# Patient Record
Sex: MALE | Race: WHITE | URBAN - METROPOLITAN AREA
[De-identification: names, ages, dates, MRNs, and addresses within clinical notes are randomized per-mention and may not be internally consistent; named-entity substitution may affect disease eponyms.]

---

## 2017-12-19 ENCOUNTER — OFFICE VISIT (OUTPATIENT)
Dept: FAMILY MEDICINE CLINIC | Age: 32
End: 2017-12-19

## 2017-12-19 VITALS
DIASTOLIC BLOOD PRESSURE: 79 MMHG | RESPIRATION RATE: 16 BRPM | HEIGHT: 73 IN | SYSTOLIC BLOOD PRESSURE: 122 MMHG | HEART RATE: 89 BPM | TEMPERATURE: 97.8 F | OXYGEN SATURATION: 98 % | WEIGHT: 172.6 LBS | BODY MASS INDEX: 22.88 KG/M2

## 2017-12-19 DIAGNOSIS — G25.0 ESSENTIAL TREMOR: ICD-10-CM

## 2017-12-19 DIAGNOSIS — Z00.00 ANNUAL PHYSICAL EXAM: Primary | ICD-10-CM

## 2017-12-19 DIAGNOSIS — Z23 ENCOUNTER FOR IMMUNIZATION: ICD-10-CM

## 2017-12-19 NOTE — PROGRESS NOTES
Subjective:   Edgardo Montalvo is an 27 y.o. male w/o significant medical history who presents for complete physical exam.    The patient is new to me and to [facility] practice. Doing well. Concerns today:   He is having intermittent bilateral arm tremor at rest sometimes exacerbated by hunger, alleviated by food. He was having it since his childhood. Not really bothers him. Drinks 1 cup of coffee in the morning. No gait change. Diet: He thinks \"its healthy\" , eats veggies and salads every dinner, chicken, fish. No fast food, no soda. Drinks plenty of pure water during the day. Exercise:No only, when playing with the kids outside. Work: Works at [employer]. Social: Lives with his family, wife and 2 kids 3 and 2 yo boys. No family history of colon cancer. Allergies - reviewed:   No Known Allergies      Medications - reviewed:  No current outpatient prescriptions on file. No current facility-administered medications for this visit. Past Medical History - reviewed:  History reviewed. No pertinent past medical history. Past Surgical History - reviewed:  History reviewed. No pertinent surgical history. Family History - reviewed:  Family History   Problem Relation Age of Onset    No Known Problems Mother     Cancer Father      Pancreatic Cancer     Stroke Father      2016    Cancer Maternal Grandmother      Rectal Cancer    Tremors Maternal Grandmother      Facial Tumor         Social History - reviewed:  Social History     Social History    Marital status: UNKNOWN     Spouse name: N/A    Number of children: N/A    Years of education: N/A     Occupational History    Not on file.      Social History Main Topics    Smoking status: Current Some Day Smoker    Smokeless tobacco: Never Used    Alcohol use 4.2 oz/week     7 Cans of beer per week      Comment: Socially    Drug use: No      Comment: 3 to 4 Cigs per weeks    Sexual activity: Yes     Partners: Female     Other Topics Concern    Not on file     Social History Narrative    No narrative on file         Immunizations - reviewed:   Immunization History   Administered Date(s) Administered    Influenza Vaccine 10/30/2017    Tdap 12/19/2017         Health Maintenance reviewed -  Colonoscopy Not indicated  Hepatitis C testing Declined  Lung cancer screening Declined      Review of Systems   CONSTITUTIONAL: denies fever. Denies chills. EYES: denies double vision. Has blurry vision relieved with contacts. ENT: denies sinus congestion. Denies sinus drainage  CARDIOVASCULAR: denies chest pain. Denies palpitations  RESPIRATORY: denies shortness of breath  GI: denies abdominal pain. Denies change in stools. Denies hematochezia/melana  : denies dysuria, urinary straining and burning with urination. NEURO: +Tremors bilaterally  MUSCULOSKELETAL: denies joint pain. SKIN: denies rash. Denies easy bruising  PSYCH: denies anxiety.  Denies depression          Objective:     Visit Vitals    /79 (BP 1 Location: Left arm, BP Patient Position: Sitting)    Pulse 89    Temp 97.8 °F (36.6 °C) (Oral)    Resp 16    Ht 6' 0.5\" (1.842 m)    Wt 172 lb 9.6 oz (78.3 kg)    SpO2 98%    BMI 23.09 kg/m2       General appearance - alert, well appearing, and in no distress  Eyes - pupils equal and reactive, extraocular eye movements intact  Ears - bilateral TM's and external ear canals normal  Nose - normal and patent, no erythema, discharge or polyps  Mouth - mucous membranes moist, pharynx normal without lesions  Neck - supple, no significant adenopathy  Chest - clear to auscultation, no wheezes, rales or rhonchi, symmetric air entry  Heart - normal rate, regular rhythm, normal S1, S2, no murmurs, rubs, clicks or gallops  Abdomen - soft, nontender, nondistended, no masses or organomegaly  Neurological - alert, oriented, normal speech, no focal findings or movement disorder noted  Musculoskeletal - no joint tenderness, deformity or swelling  Extremities - peripheral pulses normal, no pedal edema, no clubbing or cyanosis  Skin - normal coloration and turgor, no rashes, no suspicious skin lesions noted      Assessment:   28 yo male with essential tremor who is here for CPE. ICD-10-CM ICD-9-CM    1. Annual physical exam Z00.00 V70.0 CBC W/O DIFF      METABOLIC PANEL, COMPREHENSIVE      LIPID PANEL   2. Essential tremor G25.0 333.1    3. Encounter for immunization Z23 V03.89 TETANUS, DIPHTHERIA TOXOIDS AND ACELLULAR PERTUSSIS VACCINE (TDAP), IN INDIVIDS. >=7, IM      MS IMMUNIZ ADMIN,1 SINGLE/COMB VAC/TOXOID         Plan:   1. Preventive visit:  · Counseled re: diet, exercise, healthy lifestyle    · Appropriate labs, vaccines, imaging studies, and referrals ordered as listed above    · Discussed the patient's BMI with him. Continue current diet, consider starting exercise daily. · The patient was counseled on the dangers of tobacco use, and was advised to quit. Reviewed strategies to maximize success, including written materials. 2. Essenatial tremor. Not bothering th pt. Will monitor for now. Advice snacks if helps with the tremors, drink plenty of fluids. If worsening of the symptoms may consider starting Propranolol. I have discussed the diagnosis with the patient and the intended plan as seen in the above orders. The patient has received an after-visit summary and questions were answered concerning future plans. I have discussed medication side effects and warnings with the patient as well. Informed pt to return to the office if new symptoms arise.       Heather Israel MD  Family Medicine Resident, PGY-2

## 2017-12-19 NOTE — PATIENT INSTRUCTIONS
Well Visit, Ages 25 to 48: Care Instructions  Your Care Instructions    Physical exams can help you stay healthy. Your doctor has checked your overall health and may have suggested ways to take good care of yourself. He or she also may have recommended tests. At home, you can help prevent illness with healthy eating, regular exercise, and other steps. Follow-up care is a key part of your treatment and safety. Be sure to make and go to all appointments, and call your doctor if you are having problems. It's also a good idea to know your test results and keep a list of the medicines you take. How can you care for yourself at home? · Reach and stay at a healthy weight. This will lower your risk for many problems, such as obesity, diabetes, heart disease, and high blood pressure. · Get at least 30 minutes of physical activity on most days of the week. Walking is a good choice. You also may want to do other activities, such as running, swimming, cycling, or playing tennis or team sports. Discuss any changes in your exercise program with your doctor. · Do not smoke or allow others to smoke around you. If you need help quitting, talk to your doctor about stop-smoking programs and medicines. These can increase your chances of quitting for good. · Talk to your doctor about whether you have any risk factors for sexually transmitted infections (STIs). Having one sex partner (who does not have STIs and does not have sex with anyone else) is a good way to avoid these infections. · Use birth control if you do not want to have children at this time. Talk with your doctor about the choices available and what might be best for you. · Protect your skin from too much sun. When you're outdoors from 10 a.m. to 4 p.m., stay in the shade or cover up with clothing and a hat with a wide brim. Wear sunglasses that block UV rays. Even when it's cloudy, put broad-spectrum sunscreen (SPF 30 or higher) on any exposed skin.   · See a dentist one or two times a year for checkups and to have your teeth cleaned. · Wear a seat belt in the car. · Drink alcohol in moderation, if at all. That means no more than 2 drinks a day for men and 1 drink a day for women. Follow your doctor's advice about when to have certain tests. These tests can spot problems early. For everyone  · Cholesterol. Have the fat (cholesterol) in your blood tested after age 21. Your doctor will tell you how often to have this done based on your age, family history, or other things that can increase your risk for heart disease. · Blood pressure. Have your blood pressure checked during a routine doctor visit. Your doctor will tell you how often to check your blood pressure based on your age, your blood pressure results, and other factors. · Vision. Talk with your doctor about how often to have a glaucoma test.  · Diabetes. Ask your doctor whether you should have tests for diabetes. · Colon cancer. Have a test for colon cancer at age 48. You may have one of several tests. If you are younger than 48, you may need a test earlier if you have any risk factors. Risk factors include whether you already had a precancerous polyp removed from your colon or whether your parent, brother, sister, or child has had colon cancer. For women  · Breast exam and mammogram. Talk to your doctor about when you should have a clinical breast exam and a mammogram. Medical experts differ on whether and how often women under 50 should have these tests. Your doctor can help you decide what is right for you. · Pap test and pelvic exam. Begin Pap tests at age 24. A Pap test is the best way to find cervical cancer. The test often is part of a pelvic exam. Ask how often to have this test.  · Tests for sexually transmitted infections (STIs). Ask whether you should have tests for STIs. You may be at risk if you have sex with more than one person, especially if your partners do not wear condoms.   For men  · Tests for sexually transmitted infections (STIs). Ask whether you should have tests for STIs. You may be at risk if you have sex with more than one person, especially if you do not wear a condom. · Testicular cancer exam. Ask your doctor whether you should check your testicles regularly. · Prostate exam. Talk to your doctor about whether you should have a blood test (called a PSA test) for prostate cancer. Experts differ on whether and when men should have this test. Some experts suggest it if you are older than 39 and are -American or have a father or brother who got prostate cancer when he was younger than 72. When should you call for help? Watch closely for changes in your health, and be sure to contact your doctor if you have any problems or symptoms that concern you. Where can you learn more? Go to http://janel-chrystal.info/. Enter P072 in the search box to learn more about \"Well Visit, Ages 25 to 48: Care Instructions. \"  Current as of: May 12, 2017  Content Version: 11.4  © 8150-9829 [employer]. Care instructions adapted under license by [employer] (which disclaims liability or warranty for this information). If you have questions about a medical condition or this instruction, always ask your healthcare professional. [place] [address] any warranty or liability for your use of this information. Benign Essential Tremor: Care Instructions  Your Care Instructions    Benign essential tremor is a medical term for shaking that you can't control. Your hand or fingers may shake when you lift a cup or point at something. Or your voice may shake when you speak. This type of tremor is not harmful. It is not caused by a stroke or Parkinson's disease. Some things can affect how much you shake. For example, drinking or eating something with caffeine may make tremors worse for a while. Some medicines also can increase tremors.  These include antidepressants and too much thyroid replacement. Talk to your doctor if you think one of your medicines makes your tremors worse. If you are self-conscious about your tremors, there are some things you can do to reduce them or make them less noticeable. This includes taking medicine. Follow-up care is a key part of your treatment and safety. Be sure to make and go to all appointments, and call your doctor if you are having problems. It's also a good idea to know your test results and keep a list of the medicines you take. How can you care for yourself at home? · Take your medicines exactly as prescribed. Call your doctor if you think you are having a problem with your medicine. Some medicines that help control tremors have to be taken every day, even if you are not having tremors. You will get more details on the specific medicines your doctor prescribes. · Get plenty of rest.  · Eat a balanced, healthy diet. · Try to reduce stress. Regular exercise and massages may help. · Limit alcohol. Heavy drinking can make your tremors worse. · Avoid drinks or foods with caffeine if they make your tremors worse. These include tea, cola, coffee, and chocolate. · Wear a heavy bracelet or watch. This adds a little weight to your hand. The extra weight may reduce tremors. · Drink from cups or glasses that are only half full. You may also want to try drinking with a straw. When should you call for help? Watch closely for changes in your health, and be sure to contact your doctor if:  ? · You notice your tremors are getting worse. ? · You can't do your everyday activities because of your tremors. ? · You are sad and embarrassed about your shaking. Where can you learn more? Go to http://janel-chrystal.info/. Enter B746 in the search box to learn more about \"Benign Essential Tremor: Care Instructions. \"  Current as of: October 14, 2016  Content Version: 11.4  © 7822-8800 [employer]. Care instructions adapted under license by [employer] (which disclaims liability or warranty for this information). If you have questions about a medical condition or this instruction, always ask your healthcare professional. [address] any warranty or liability for your use of this information.

## 2017-12-19 NOTE — PROGRESS NOTES
Chief Complaint   Patient presents with   [address]     Patient is new to the area the beginning of 2017 and establish care     Well Male     1. Have you been to the ER, urgent care clinic since your last visit? Hospitalized since your last visit? No    2. Have you seen or consulted any other health care providers outside of the [address] since your last visit? Include any pap smears or colon screening. Calli Vázquez is a 27 y.o. male who presents for routine immunizations. He denies any symptoms , reactions or allergies that would exclude them from being immunized today. Risks and adverse reactions were discussed and the VIS was given to them. All questions were addressed. He was observed for 15 min post injection. There were no reactions observed. Jessie Zamorano LPN      Verbal order given for to order Adult Tdap per physician. Verbal order repeated for clarification and verification.  Tdap ordered per physician

## 2017-12-19 NOTE — MR AVS SNAPSHOT
Visit Information Date & Time Provider Department Dept. Phone Encounter #  
 12/19/2017  9:15 AM Eva Soler MD [facility] [phone] [id number] Upcoming Health Maintenance Date Due DTaP/Tdap/Td series (2 - Td) 12/19/2027 Allergies as of 12/19/2017  Review Complete On: 12/19/2017 By: Eva Soler MD  
 No Known Allergies Current Immunizations  Never Reviewed Name Date Influenza Vaccine 10/30/2017 Not reviewed this visit You Were Diagnosed With   
  
 Codes Comments Annual physical exam    -  Primary ICD-10-CM: Z00.00 ICD-9-CM: V70.0 Essential tremor     ICD-10-CM: G25.0 ICD-9-CM: 333.1 Vitals BP Pulse Temp Resp Weight(growth percentile) SpO2  
 122/79 (BP 1 Location: Left arm, BP Patient Position: Sitting) 89 97.8 °F (36.6 °C) (Oral) 16 172 lb 9.6 oz (78.3 kg) 98% Smoking Status Current Some Day Smoker Vitals History Preferred Pharmacy Pharmacy Name Phone [id number] [address], [address] [phone] Your Updated Medication List  
  
Notice  As of 12/19/2017 10:04 AM  
 You have not been prescribed any medications. We Performed the Following CBC W/O DIFF [96965 CPT(R)] LIPID PANEL [42029 CPT(R)] METABOLIC PANEL, COMPREHENSIVE [20292 CPT(R)] Patient Instructions Well Visit, Ages 25 to 48: Care Instructions Your Care Instructions Physical exams can help you stay healthy. Your doctor has checked your overall health and may have suggested ways to take good care of yourself. He or she also may have recommended tests. At home, you can help prevent illness with healthy eating, regular exercise, and other steps. Follow-up care is a key part of your treatment and safety.  Be sure to make and go to all appointments, and call your doctor if you are having problems. It's also a good idea to know your test results and keep a list of the medicines you take. How can you care for yourself at home? · Reach and stay at a healthy weight. This will lower your risk for many problems, such as obesity, diabetes, heart disease, and high blood pressure. · Get at least 30 minutes of physical activity on most days of the week. Walking is a good choice. You also may want to do other activities, such as running, swimming, cycling, or playing tennis or team sports. Discuss any changes in your exercise program with your doctor. · Do not smoke or allow others to smoke around you. If you need help quitting, talk to your doctor about stop-smoking programs and medicines. These can increase your chances of quitting for good. · Talk to your doctor about whether you have any risk factors for sexually transmitted infections (STIs). Having one sex partner (who does not have STIs and does not have sex with anyone else) is a good way to avoid these infections. · Use birth control if you do not want to have children at this time. Talk with your doctor about the choices available and what might be best for you. · Protect your skin from too much sun. When you're outdoors from 10 a.m. to 4 p.m., stay in the shade or cover up with clothing and a hat with a wide brim. Wear sunglasses that block UV rays. Even when it's cloudy, put broad-spectrum sunscreen (SPF 30 or higher) on any exposed skin. · See a dentist one or two times a year for checkups and to have your teeth cleaned. · Wear a seat belt in the car. · Drink alcohol in moderation, if at all. That means no more than 2 drinks a day for men and 1 drink a day for women. Follow your doctor's advice about when to have certain tests. These tests can spot problems early. For everyone · Cholesterol. Have the fat (cholesterol) in your blood tested after age 21.  Your doctor will tell you how often to have this done based on your age, family history, or other things that can increase your risk for heart disease. · Blood pressure. Have your blood pressure checked during a routine doctor visit. Your doctor will tell you how often to check your blood pressure based on your age, your blood pressure results, and other factors. · Vision. Talk with your doctor about how often to have a glaucoma test. 
· Diabetes. Ask your doctor whether you should have tests for diabetes. · Colon cancer. Have a test for colon cancer at age 48. You may have one of several tests. If you are younger than 48, you may need a test earlier if you have any risk factors. Risk factors include whether you already had a precancerous polyp removed from your colon or whether your parent, brother, sister, or child has had colon cancer. For women · Breast exam and mammogram. Talk to your doctor about when you should have a clinical breast exam and a mammogram. Medical experts differ on whether and how often women under 50 should have these tests. Your doctor can help you decide what is right for you. · Pap test and pelvic exam. Begin Pap tests at age 24. A Pap test is the best way to find cervical cancer. The test often is part of a pelvic exam. Ask how often to have this test. 
· Tests for sexually transmitted infections (STIs). Ask whether you should have tests for STIs. You may be at risk if you have sex with more than one person, especially if your partners do not wear condoms. For men · Tests for sexually transmitted infections (STIs). Ask whether you should have tests for STIs. You may be at risk if you have sex with more than one person, especially if you do not wear a condom. · Testicular cancer exam. Ask your doctor whether you should check your testicles regularly. · Prostate exam. Talk to your doctor about whether you should have a blood test (called a PSA test) for prostate cancer.  Experts differ on whether and when men should have this test. Some experts suggest it if you are older than 39 and are -American or have a father or brother who got prostate cancer when he was younger than 72. When should you call for help? Watch closely for changes in your health, and be sure to contact your doctor if you have any problems or symptoms that concern you. Where can you learn more? Go to http://janel-chrystal.info/. Enter P072 in the search box to learn more about \"Well Visit, Ages 25 to 48: Care Instructions. \" Current as of: May 12, 2017 Content Version: 11.4 © 7120-3574 ServiceTrade. Care instructions adapted under license by Ribbit (which disclaims liability or warranty for this information). If you have questions about a medical condition or this instruction, always ask your healthcare professional. [address] any warranty or liability for your use of this information. Benign Essential Tremor: Care Instructions Your Care Instructions Benign essential tremor is a medical term for shaking that you can't control. Your hand or fingers may shake when you lift a cup or point at something. Or your voice may shake when you speak. This type of tremor is not harmful. It is not caused by a stroke or Parkinson's disease. Some things can affect how much you shake. For example, drinking or eating something with caffeine may make tremors worse for a while. Some medicines also can increase tremors. These include antidepressants and too much thyroid replacement. Talk to your doctor if you think one of your medicines makes your tremors worse. If you are self-conscious about your tremors, there are some things you can do to reduce them or make them less noticeable. This includes taking medicine. Follow-up care is a key part of your treatment and safety.  Be sure to make and go to all appointments, and call your doctor if you are having problems. It's also a good idea to know your test results and keep a list of the medicines you take. How can you care for yourself at home? · Take your medicines exactly as prescribed. Call your doctor if you think you are having a problem with your medicine. Some medicines that help control tremors have to be taken every day, even if you are not having tremors. You will get more details on the specific medicines your doctor prescribes. · Get plenty of rest. 
· Eat a balanced, healthy diet. · Try to reduce stress. Regular exercise and massages may help. · Limit alcohol. Heavy drinking can make your tremors worse. · Avoid drinks or foods with caffeine if they make your tremors worse. These include tea, cola, coffee, and chocolate. · Wear a heavy bracelet or watch. This adds a little weight to your hand. The extra weight may reduce tremors. · Drink from cups or glasses that are only half full. You may also want to try drinking with a straw. When should you call for help? Watch closely for changes in your health, and be sure to contact your doctor if: 
? · You notice your tremors are getting worse. ? · You can't do your everyday activities because of your tremors. ? · You are sad and embarrassed about your shaking. Where can you learn more? Go to http://janel-chrystal.info/. Enter B746 in the search box to learn more about \"Benign Essential Tremor: Care Instructions. \" Current as of: October 14, 2016 Content Version: 11.4 © 4113-3752 Healthwise, Incorporated. Care instructions adapted under license by Shelfbucks (which disclaims liability or warranty for this information). If you have questions about a medical condition or this instruction, always ask your healthcare professional. [place] [address] any warranty or liability for your use of this information. Introducing \[facility]\"" & HEALTH SERVICES! Romayne Duster introduces OncoGenex patient portal. Now you can access parts of your medical record, email your doctor's office, and request medication refills online. 1. In your internet browser, go to [email or web address] 2. Click on the First Time User? Click Here link in the Sign In box. You will see the New Member Sign Up page. 3. Enter your OncoGenex Access Code exactly as it appears below. You will not need to use this code after youve completed the sign-up process. If you do not sign up before the expiration date, you must request a new code. · OncoGenex Access Code: [email or web address] Expires: 3/19/2018  9:33 AM 
 
4. Enter the last four digits of your Social Security Number (xxxx) and Date of Birth (mm/dd/yyyy) as indicated and click Submit. You will be taken to the next sign-up page. 5. Create a OncoGenex ID. This will be your OncoGenex login ID and cannot be changed, so think of one that is secure and easy to remember. 6. Create a OncoGenex password. You can change your password at any time. 7. Enter your Password Reset Question and Answer. This can be used at a later time if you forget your password. 8. Enter your e-mail address. You will receive e-mail notification when new information is available in [address] 9. Click Sign Up. You can now view and download portions of your medical record. 10. Click the Download Summary menu link to download a portable copy of your medical information. If you have questions, please visit the Frequently Asked Questions section of the OncoGenex website. Remember, OncoGenex is NOT to be used for urgent needs. For medical emergencies, dial 911. Now available from your iPhone and Android! Please provide this summary of care documentation to your next provider. Your primary care clinician is listed as Rodell Rinne. If you have any questions after today's visit, please call [phone].

## 2017-12-20 LAB
ALBUMIN SERPL-MCNC: 4.6 G/DL (ref 3.5–5.5)
ALBUMIN/GLOB SERPL: 2 {RATIO} (ref 1.2–2.2)
ALP SERPL-CCNC: 77 IU/L (ref 39–117)
ALT SERPL-CCNC: 15 IU/L (ref 0–44)
AST SERPL-CCNC: 19 IU/L (ref 0–40)
BILIRUB SERPL-MCNC: 0.6 MG/DL (ref 0–1.2)
BUN SERPL-MCNC: 21 MG/DL (ref 6–20)
BUN/CREAT SERPL: 18 (ref 9–20)
CALCIUM SERPL-MCNC: 9.8 MG/DL (ref 8.7–10.2)
CHLORIDE SERPL-SCNC: 100 MMOL/L (ref 96–106)
CHOLEST SERPL-MCNC: 187 MG/DL (ref 100–199)
CO2 SERPL-SCNC: 21 MMOL/L (ref 18–29)
CREAT SERPL-MCNC: 1.19 MG/DL (ref 0.76–1.27)
ERYTHROCYTE [DISTWIDTH] IN BLOOD BY AUTOMATED COUNT: 13.2 % (ref 12.3–15.4)
GLOBULIN SER CALC-MCNC: 2.3 G/DL (ref 1.5–4.5)
GLUCOSE SERPL-MCNC: 82 MG/DL (ref 65–99)
HCT VFR BLD AUTO: 47.3 % (ref 37.5–51)
HDLC SERPL-MCNC: 72 MG/DL
HGB BLD-MCNC: 16.4 G/DL (ref 13–17.7)
INTERPRETATION, 910389: NORMAL
LDLC SERPL CALC-MCNC: 103 MG/DL (ref 0–99)
MCH RBC QN AUTO: 31.1 PG (ref 26.6–33)
MCHC RBC AUTO-ENTMCNC: 34.7 G/DL (ref 31.5–35.7)
MCV RBC AUTO: 90 FL (ref 79–97)
PLATELET # BLD AUTO: 185 X10E3/UL (ref 150–379)
POTASSIUM SERPL-SCNC: 5 MMOL/L (ref 3.5–5.2)
PROT SERPL-MCNC: 6.9 G/DL (ref 6–8.5)
RBC # BLD AUTO: 5.28 X10E6/UL (ref 4.14–5.8)
SODIUM SERPL-SCNC: 141 MMOL/L (ref 134–144)
TRIGL SERPL-MCNC: 61 MG/DL (ref 0–149)
VLDLC SERPL CALC-MCNC: 12 MG/DL (ref 5–40)
WBC # BLD AUTO: 5.2 X10E3/UL (ref 3.4–10.8)

## 2017-12-29 ENCOUNTER — TELEPHONE (OUTPATIENT)
Dept: FAMILY MEDICINE CLINIC | Age: 32
End: 2017-12-29

## 2017-12-29 NOTE — TELEPHONE ENCOUNTER
I called the patient at [phone]  to discuss the test results.   Left a message to call back the clinic.    6:57 PM  12/29/2017  Artemio Balderrama MD

## 2018-01-25 ENCOUNTER — OFFICE VISIT (OUTPATIENT)
Dept: FAMILY MEDICINE CLINIC | Age: 33
End: 2018-01-25

## 2018-01-25 VITALS
RESPIRATION RATE: 16 BRPM | OXYGEN SATURATION: 94 % | TEMPERATURE: 97.7 F | SYSTOLIC BLOOD PRESSURE: 138 MMHG | BODY MASS INDEX: 23.32 KG/M2 | HEART RATE: 80 BPM | WEIGHT: 172.2 LBS | DIASTOLIC BLOOD PRESSURE: 88 MMHG | HEIGHT: 72 IN

## 2018-01-25 DIAGNOSIS — R05.9 COUGH: Primary | ICD-10-CM

## 2018-01-25 DIAGNOSIS — J01.00 ACUTE MAXILLARY SINUSITIS, RECURRENCE NOT SPECIFIED: ICD-10-CM

## 2018-01-25 RX ORDER — DOXYCYCLINE 100 MG/1
200 TABLET ORAL DAILY
Qty: 10 TAB | Refills: 0 | Status: SHIPPED | OUTPATIENT
Start: 2018-01-25 | End: 2018-01-30

## 2018-01-25 NOTE — PROGRESS NOTES
HPI       Kandace Starkey is a 27 y.o. male with no significant pmhx who presents for acute visit. Cough x 2 weeks - productive but doesn't wake him up at night. Head pain, HA, jaw and ear pain x a few days - sharp pains in ears, HA  Facial pressure, ears popping with blowing of nose  Very mild sore throat from the cough, has not affected swallowing. Has decreased appetite. Had chills one night about 1.5 weeks ago  No fevers, nausea or vomiting, body aches, diarrhea, CP or SOB  Has been taking Advil cold and sinus continuously, thinks it helps w headaches. Hasn't tried anything for the cough. Received flu vaccine 10/2017    Sick contacts - coworkers have been sick with cold symptoms, 1year old has now started to have similar symptoms. No hx of asthma or COPD, no hospitalizations for respiratory status. Occasional social smoker but has not had a cigarette in over a month. PMHx:  History reviewed. No pertinent past medical history. Meds:     Allergies:   No Known Allergies    Smoker:  History   Smoking Status    Current Some Day Smoker   Smokeless Tobacco    Never Used     ETOH:   History   Alcohol Use    4.2 oz/week    7 Cans of beer per week     Comment: Socially     FH:   Family History   Problem Relation Age of Onset    No Known Problems Mother     Cancer Father      Pancreatic Cancer     Stroke Father      2016    Cancer Maternal Grandmother      Rectal Cancer    Tremors Maternal Grandmother      Facial Tumor       ROS  Review of Systems   Constitutional: Negative for chills, fever and malaise/fatigue. HENT: Positive for congestion, ear pain, sinus pain and sore throat. Negative for ear discharge. Respiratory: Positive for cough and sputum production. Negative for shortness of breath and wheezing. Cardiovascular: Negative for chest pain and palpitations. Gastrointestinal: Negative for abdominal pain, constipation, diarrhea, nausea and vomiting.    Musculoskeletal: Negative for back pain, joint pain and myalgias. Neurological: Positive for headaches. Negative for dizziness. Physical Exam:  Visit Vitals    /88 (BP 1 Location: Right arm, BP Patient Position: Sitting)    Pulse 80    Temp 97.7 °F (36.5 °C) (Oral)    Resp 16    Ht 6' (1.829 m)    Wt 172 lb 3.2 oz (78.1 kg)    SpO2 94%    BMI 23.35 kg/m2       Wt Readings from Last 3 Encounters:   01/25/18 172 lb 3.2 oz (78.1 kg)   12/19/17 172 lb 9.6 oz (78.3 kg)     BP Readings from Last 3 Encounters:   01/25/18 138/88   12/19/17 122/79      Physical Exam   Constitutional: He is oriented to person, place, and time. He appears well-developed and well-nourished. HENT:   Head: Normocephalic and atraumatic. Right Ear: External ear normal.   Left Ear: External ear normal.   Nose: Nose normal.   Mouth/Throat: Oropharynx is clear and moist. No oropharyngeal exudate. Eyes: EOM are normal. Pupils are equal, round, and reactive to light. Cardiovascular: Normal rate, regular rhythm, normal heart sounds and intact distal pulses. Exam reveals no gallop and no friction rub. No murmur heard. Pulmonary/Chest: Effort normal. No respiratory distress. Intermittent end-inspiratory wheezing present at RLB and R lung anteriorly. Otherwise clear. Abdominal: Soft. Bowel sounds are normal.   Neurological: He is alert and oriented to person, place, and time. Skin: Skin is warm and dry. I personally reviewed the following lab results:   No results found for this or any previous visit (from the past 12 hour(s)). Assessment     27 y.o. male with:    ICD-10-CM ICD-9-CM    1. Cough R05 786.2 XR CHEST PA LAT   2.  Acute maxillary sinusitis, recurrence not specified J01.00 461.0 doxycycline (ADOXA) 100 mg tablet              Plan       Orders Placed This Encounter    XR CHEST PA LAT    doxycycline (ADOXA) 100 mg tablet     Bacterial sinusitis  - CXR clear for any pneumonia or acute abnormality, personally interpreted by myself and Dr. French Huizar  - Doxycyline 200mg daily x 5 days  - Patient to RTC if symptoms not improving over the next several days  - ED precautions discussed    Patient discussed and seen with Dr. French Huizar    I have discussed the diagnosis with the patient and the intended plan as seen in the above orders. The patient has received an after-visit summary and questions were answered concerning future plans. I have discussed medication side effects and warnings with the patient as well.     Digna Winchester MD  Family Medicine Resident  PGY-1

## 2018-01-25 NOTE — PROGRESS NOTES
I reviewed with the resident the medical history and the resident's findings on the physical examination. I discussed with the resident the patient's diagnosis and concur with the plan.     I also appreciated few end inspiratory wheeze on exam  Reviewed CXR with resident and it showed no obvious abnormality or airspace disease    Tx for sinusitis as outlined in resident note

## 2018-01-25 NOTE — PROGRESS NOTES
Helen Lopez is a 27 y.o. Chief Complaint   Patient presents with    Cough     x 2 weeks     Ear Pain     a few days     Headache     a few days        1. Have you been to the ER, urgent care clinic since your last visit? Hospitalized since your last visit? No    2. Have you seen or consulted any other health care providers outside of the [address] [place] [address] since your last visit? Include any pap smears or colon screening.   No      Visit Vitals    /88 (BP 1 Location: Right arm, BP Patient Position: Sitting)    Pulse 80    Temp 97.7 °F (36.5 °C) (Oral)    Resp 16    Ht 6' (1.829 m)    Wt 172 lb 3.2 oz (78.1 kg)    SpO2 94%    BMI 23.35 kg/m2       Health Maintenance Due   Topic Date Due    Pneumococcal 19-64 Medium Risk (1 of 1 - PPSV23) 07/18/2002

## 2018-01-25 NOTE — MR AVS SNAPSHOT
[address] 
[phone] Patient: Julian Lacy MRN: [id number] :1985 Visit Information Date & Time Provider Department Dept. Phone Encounter #  
 2018  8:45 AM Frances Heredia MD [address] [place], [place] [address] [phone] [id number] Upcoming Health Maintenance Date Due Pneumococcal 19-64 Medium Risk (1 of 1 - PPSV23) 2002 DTaP/Tdap/Td series (2 - Td) 2027 Allergies as of 2018  Review Complete On: 2018 By: Laura Almendarez No Known Allergies Current Immunizations  Never Reviewed Name Date Influenza Vaccine 10/30/2017 Tdap 2017 Not reviewed this visit You Were Diagnosed With   
  
 Codes Comments Cough    -  Primary ICD-10-CM: A70 ICD-9-CM: 786.2 Acute maxillary sinusitis, recurrence not specified     ICD-10-CM: J01.00 ICD-9-CM: 461.0 Vitals BP Pulse Temp Resp Height(growth percentile) Weight(growth percentile) 138/88 (BP 1 Location: Right arm, BP Patient Position: Sitting) 80 97.7 °F (36.5 °C) (Oral) 16 6' (1.829 m) 172 lb 3.2 oz (78.1 kg) SpO2 BMI Smoking Status 94% 23.35 kg/m2 Current Some Day Smoker Vitals History BMI and BSA Data Body Mass Index Body Surface Area  
 23.35 kg/m 2 1.99 m 2 Preferred Pharmacy Pharmacy Name Phone [id number] [address] [facility], [address] [phone] Your Updated Medication List  
  
   
This list is accurate as of: 18  9:34 AM.  Always use your most recent med list.  
  
  
  
  
 doxycycline 100 mg tablet Commonly known as:  ADOXA Take 2 Tabs by mouth daily for 5 days. Prescriptions Sent to Pharmacy Refills  
 doxycycline (ADOXA) 100 mg tablet 0 Sig: Take 2 Tabs by mouth daily for 5 days.   
 Class: Normal  
 Pharmacy: [facility] [address] Ph #: [phone] Route: Oral  
  
To-Do List   
 01/25/2018 Imaging:  XR CHEST PA LAT Patient Instructions Sinusitis: Care Instructions Your Care Instructions Sinusitis is an infection of the lining of the sinus cavities in your head. Sinusitis often follows a cold. It causes pain and pressure in your head and face. In most cases, sinusitis gets better on its own in 1 to 2 weeks. But some mild symptoms may last for several weeks. Sometimes antibiotics are needed. Follow-up care is a key part of your treatment and safety. Be sure to make and go to all appointments, and call your doctor if you are having problems. It's also a good idea to know your test results and keep a list of the medicines you take. How can you care for yourself at home? · Take an over-the-counter pain medicine, such as acetaminophen (Tylenol), ibuprofen (Advil, Motrin), or naproxen (Aleve). Read and follow all instructions on the label. · If the doctor prescribed antibiotics, take them as directed. Do not stop taking them just because you feel better. You need to take the full course of antibiotics. · Be careful when taking over-the-counter cold or flu medicines and Tylenol at the same time. Many of these medicines have acetaminophen, which is Tylenol. Read the labels to make sure that you are not taking more than the recommended dose. Too much acetaminophen (Tylenol) can be harmful. · Breathe warm, moist air from a steamy shower, a hot bath, or a sink filled with hot water. Avoid cold, dry air. Using a humidifier in your home may help. Follow the directions for cleaning the machine. · Use saline (saltwater) nasal washes to help keep your nasal passages open and wash out mucus and bacteria. You can buy saline nose drops at a grocery store or drugstore.  Or you can make your own at home by adding 1 teaspoon of salt and 1 teaspoon of baking soda to 2 cups of distilled water. If you make your own, fill a bulb syringe with the solution, insert the tip into your nostril, and squeeze gently. Clance Alba your nose. · Put a hot, wet towel or a warm gel pack on your face 3 or 4 times a day for 5 to 10 minutes each time. · Try a decongestant nasal spray like oxymetazoline (Afrin). Do not use it for more than 3 days in a row. Using it for more than 3 days can make your congestion worse. When should you call for help? Call your doctor now or seek immediate medical care if: 
? · You have new or worse swelling or redness in your face or around your eyes. ? · You have a new or higher fever. ? Watch closely for changes in your health, and be sure to contact your doctor if: 
? · You have new or worse facial pain. ? · The mucus from your nose becomes thicker (like pus) or has new blood in it. ? · You are not getting better as expected. Where can you learn more? Go to http://janel-chrystal.info/. Enter D765 in the search box to learn more about \"Sinusitis: Care Instructions. \" Current as of: May 12, 2017 Content Version: 11.4 © 9001-2570 Ter[employer]-Gen Power. Care instructions adapted under license by LibertadCard (which disclaims liability or warranty for this information). If you have questions about a medical condition or this instruction, always ask your healthcare professional. [place] [address] any warranty or liability for your use of this information. Introducing [facility] & HEALTH SERVICES! Dear Suhas Sifuentes: Thank you for requesting a Private Company account. Our records indicate that you already have an active Private Company account. You can access your account anytime at [email or web address]. [email or web address] [email or web address]*Situ Architecture Did you know that you can access your hospital and ER discharge instructions at any time in Private Company? You can also review all of your test results from your hospital stay or ER visit. Additional Information If you have questions, please visit the Frequently Asked Questions section of the VANDOLAYhart website at [email or web address] Remember, [facility] is NOT to be used for urgent needs. For medical emergencies, dial 911. Now available from your iPhone and Android! Please provide this summary of care documentation to your next provider. Your primary care clinician is listed as Shane Mclean. If you have any questions after today's visit, please call [phone].

## 2018-01-25 NOTE — PATIENT INSTRUCTIONS
Sinusitis: Care Instructions  Your Care Instructions    Sinusitis is an infection of the lining of the sinus cavities in your head. Sinusitis often follows a cold. It causes pain and pressure in your head and face. In most cases, sinusitis gets better on its own in 1 to 2 weeks. But some mild symptoms may last for several weeks. Sometimes antibiotics are needed. Follow-up care is a key part of your treatment and safety. Be sure to make and go to all appointments, and call your doctor if you are having problems. It's also a good idea to know your test results and keep a list of the medicines you take. How can you care for yourself at home? · Take an over-the-counter pain medicine, such as acetaminophen (Tylenol), ibuprofen (Advil, Motrin), or naproxen (Aleve). Read and follow all instructions on the label. · If the doctor prescribed antibiotics, take them as directed. Do not stop taking them just because you feel better. You need to take the full course of antibiotics. · Be careful when taking over-the-counter cold or flu medicines and Tylenol at the same time. Many of these medicines have acetaminophen, which is Tylenol. Read the labels to make sure that you are not taking more than the recommended dose. Too much acetaminophen (Tylenol) can be harmful. · Breathe warm, moist air from a steamy shower, a hot bath, or a sink filled with hot water. Avoid cold, dry air. Using a humidifier in your home may help. Follow the directions for cleaning the machine. · Use saline (saltwater) nasal washes to help keep your nasal passages open and wash out mucus and bacteria. You can buy saline nose drops at a grocery store or drugstore. Or you can make your own at home by adding 1 teaspoon of salt and 1 teaspoon of baking soda to 2 cups of distilled water. If you make your own, fill a bulb syringe with the solution, insert the tip into your nostril, and squeeze gently. [employer] your nose.   · Put a hot, wet towel or a warm gel pack on your face 3 or 4 times a day for 5 to 10 minutes each time. · Try a decongestant nasal spray like oxymetazoline (Afrin). Do not use it for more than 3 days in a row. Using it for more than 3 days can make your congestion worse. When should you call for help? Call your doctor now or seek immediate medical care if:  ? · You have new or worse swelling or redness in your face or around your eyes. ? · You have a new or higher fever. ? Watch closely for changes in your health, and be sure to contact your doctor if:  ? · You have new or worse facial pain. ? · The mucus from your nose becomes thicker (like pus) or has new blood in it. ? · You are not getting better as expected. Where can you learn more? Go to http://janel-chrystal.info/. Enter K540 in the search box to learn more about \"Sinusitis: Care Instructions. \"  Current as of: May 12, 2017  Content Version: 11.4  © 4986-5771 [employer]. Care instructions adapted under license by [employer] (which disclaims liability or warranty for this information). If you have questions about a medical condition or this instruction, always ask your healthcare professional. [address] any warranty or liability for your use of this information.

## 2018-10-02 ENCOUNTER — OFFICE VISIT (OUTPATIENT)
Dept: FAMILY MEDICINE CLINIC | Age: 33
End: 2018-10-02

## 2018-10-02 VITALS
WEIGHT: 178 LBS | TEMPERATURE: 98.4 F | BODY MASS INDEX: 24.11 KG/M2 | HEIGHT: 72 IN | HEART RATE: 69 BPM | DIASTOLIC BLOOD PRESSURE: 71 MMHG | SYSTOLIC BLOOD PRESSURE: 118 MMHG | RESPIRATION RATE: 16 BRPM | OXYGEN SATURATION: 98 %

## 2018-10-02 DIAGNOSIS — L73.1 INGROWN HAIR: Primary | ICD-10-CM

## 2018-10-02 NOTE — PROGRESS NOTES
HPI     CC: ingrown hair    Yan De Guzman is a 26 y.o. male who presents with ingrown hair     Ingrown hair for several months at pubic area. Starting to get larger and firmer. Last night, was squeezing it, and blood came out. Has trimmed, but not shaved. No fevers or chills. No erythema or tenderness. No other bumps in his groin area. No dysuria. PMHx - Reviewed  No past medical history on file. Meds - Reviewed  none    Allergies - Reviewed  No Known Allergies    Smoker - Reviewed  History   Smoking Status    Former Smoker   Smokeless Tobacco    Never Used     Comment: occasional use       ETOH - Reviewed  History   Alcohol Use    6.0 oz/week    10 Standard drinks or equivalent per week       FH - Reviewed  Family History   Problem Relation Age of Onset    No Known Problems Mother     Cancer Father      Pancreatic Cancer     Stroke Father      2016    Cancer Maternal Grandmother      Rectal Cancer    Tremors Maternal Grandmother      Facial Tumor       ROS:  Review of Systems   Constitutional: Negative. Negative for chills and fever. Respiratory: Negative for chest tightness. Cardiovascular: Negative for chest pain. Genitourinary: Negative for dysuria, genital sores and hematuria. Skin:        Ingrown hair in pubic area. Physical Exam:  Visit Vitals    /71 (BP 1 Location: Left arm, BP Patient Position: Sitting)    Pulse 69    Temp 98.4 °F (36.9 °C) (Oral)    Resp 16    Ht 6' (1.829 m)    Wt 178 lb (80.7 kg)    SpO2 98%    BMI 24.14 kg/m2       Wt Readings from Last 3 Encounters:   10/02/18 178 lb (80.7 kg)   01/25/18 172 lb 3.2 oz (78.1 kg)   12/19/17 172 lb 9.6 oz (78.3 kg)     BP Readings from Last 3 Encounters:   10/02/18 118/71   01/25/18 138/88   12/19/17 122/79        Physical Exam   Constitutional: He is oriented to person, place, and time. He appears well-developed and well-nourished. No distress. HENT:   Head: Normocephalic and atraumatic. Mouth/Throat: Oropharynx is clear and moist.   Eyes: No scleral icterus. Cardiovascular: Normal rate and regular rhythm. Pulmonary/Chest: Effort normal and breath sounds normal.   Neurological: He is alert and oriented to person, place, and time. He exhibits normal muscle tone. Coordination normal.   Skin: He is not diaphoretic. Warm, dry. Single papule approximately 10 x10 mm near the top of his pubic line. No surrounding erythema. No open wounds. No other rashes or masses. Nursing note and vitals reviewed. Assessment     26 y.o. male presents with ingrown hair  There is no problem list on file for this patient. Today's diagnoses are:    ICD-10-CM ICD-9-CM    1. Ingrown hair L73.1 704.8               Plan     1. Ingrown hair - likely due to hair trimming. No signs of infection today   - discussed preventative measures, such as limiting shaving/ trimming, exfoliating, and good washing techniques. Avoid trimming/ shaving in that area for at least one month, or until issue resolves  - return for evaluation if signs of infection present       Follow up as needed    Prior labs and imaging were reviewed. I have discussed the diagnosis with the patient and the intended plan as seen in the above orders. The patient has received an after-visit summary and questions were answered concerning future plans. I have discussed medication side effects and warnings with the patient as well. Patient discussed with Dr. July Asif, Attending Physician.     Sherry Wisdom MD, PGY3  Family Medicine Resident

## 2018-10-02 NOTE — PROGRESS NOTES
Identified Patient with two Patient identifiers (Name and ). Two Patient Identifiers confirmed. Reviewed record in preparation for visit and have obtained necessary documentation. Chief Complaint   Patient presents with    Skin Problem     ingrown hair at patient's waistline/pubic area - x2 months       Visit Vitals    /71 (BP 1 Location: Left arm, BP Patient Position: Sitting)    Pulse 69    Temp 98.4 °F (36.9 °C) (Oral)    Resp 16    Ht 6' (1.829 m)    Wt 178 lb (80.7 kg)    SpO2 98%    BMI 24.14 kg/m2       1. Have you been to the ER, urgent care clinic since your last visit? Hospitalized since your last visit? No    2. Have you seen or consulted any other health care providers outside of the [address] [place] [address] since your last visit? Include any pap smears or colon screening.  No

## 2018-11-09 ENCOUNTER — OFFICE VISIT (OUTPATIENT)
Dept: FAMILY MEDICINE CLINIC | Age: 33
End: 2018-11-09

## 2018-11-09 VITALS
HEIGHT: 72 IN | DIASTOLIC BLOOD PRESSURE: 81 MMHG | BODY MASS INDEX: 24.52 KG/M2 | HEART RATE: 70 BPM | SYSTOLIC BLOOD PRESSURE: 119 MMHG | OXYGEN SATURATION: 98 % | WEIGHT: 181 LBS | TEMPERATURE: 98.5 F | RESPIRATION RATE: 16 BRPM

## 2018-11-09 DIAGNOSIS — J06.9 VIRAL URI WITH COUGH: Primary | ICD-10-CM

## 2018-11-09 RX ORDER — FLUTICASONE PROPIONATE 50 MCG
2 SPRAY, SUSPENSION (ML) NASAL DAILY
Qty: 1 BOTTLE | Refills: 0 | Status: SHIPPED | OUTPATIENT
Start: 2018-11-09

## 2018-11-09 RX ORDER — CETIRIZINE HCL 10 MG
10 TABLET ORAL DAILY
Qty: 30 TAB | Refills: 0 | Status: SHIPPED | OUTPATIENT
Start: 2018-11-09

## 2018-11-09 RX ORDER — GUAIFENESIN 600 MG/1
600 TABLET, EXTENDED RELEASE ORAL 2 TIMES DAILY
Qty: 20 TAB | Refills: 0 | Status: SHIPPED | OUTPATIENT
Start: 2018-11-09 | End: 2018-11-19

## 2018-11-09 NOTE — PROGRESS NOTES
[address] [facility]    Subjective:   Diane Burton is a 26 y.o. male   CC: cough  History provided by patient            HPI:  Cough and congestion x 2 weeks - productive (yellow sputum)  Cough improving since last week. No HA, lightheadedness, fevers, nausea or vomiting, body aches, diarrhea, CP or SOB  Has been taking day quil which has not helped with his cough. Received flu vaccine last week at work.     Sick contacts - Mother was sick     No hx of asthma or COPD, no hospitalizations for respiratory status. No current outpatient medications on file prior to visit. No current facility-administered medications on file prior to visit. There is no problem list on file for this patient. Social History     Socioeconomic History    Marital status: UNKNOWN     Spouse name: Not on file    Number of children: Not on file    Years of education: Not on file    Highest education level: Not on file   Social Needs    Financial resource strain: Not on file    Food insecurity - worry: Not on file    Food insecurity - inability: Not on file   [employer] needs - medical: Not on file   [employer] needs - non-medical: Not on file   Occupational History    Not on file   Tobacco Use    Smoking status: Former Smoker    Smokeless tobacco: Never Used    Tobacco comment: occasional use   Substance and Sexual Activity    Alcohol use: Yes     Alcohol/week: 6.0 oz     Types: 10 Standard drinks or equivalent per week    Drug use: No    Sexual activity: Yes     Partners: Female   Other Topics Concern    Not on file   Social History Narrative    Not on file       Review of Systems   Constitutional: Negative for chills and fever. HENT: Negative for ear discharge and ear pain. Eyes: Negative for blurred vision. Respiratory: Negative for cough, sputum production, shortness of breath and wheezing. Cardiovascular: Negative for chest pain and palpitations. Gastrointestinal: Negative for abdominal pain, diarrhea, heartburn, nausea and vomiting. Objective:     Visit Vitals  /81 (BP 1 Location: Left arm, BP Patient Position: Sitting)   Pulse 70   Temp 98.5 °F (36.9 °C) (Oral)   Resp 16   Ht 6' (1.829 m)   Wt 181 lb (82.1 kg)   SpO2 98%   BMI 24.55 kg/m²        Physical Exam   Constitutional: He appears well-developed and well-nourished. No distress. HENT:   Head: Normocephalic and atraumatic. Right Ear: External ear normal.   Left Ear: External ear normal.   Mouth/Throat: No oropharyngeal exudate. Post nasal drip noted. Eyes: Pupils are equal, round, and reactive to light. Cardiovascular: Normal rate, regular rhythm, normal heart sounds and intact distal pulses. Exam reveals no gallop and no friction rub. No murmur heard. Pulmonary/Chest: Breath sounds normal. No respiratory distress. He has no wheezes. He has no rales. He exhibits no tenderness. Lymphadenopathy:     He has no cervical adenopathy. Skin: He is not diaphoretic. Assessment and orders:       ICD-10-CM ICD-9-CM    1. Viral URI with cough J06.9 465.9 guaiFENesin ER (MUCINEX) 600 mg ER tablet    B97.89  cetirizine (ZYRTEC) 10 mg tablet      fluticasone (FLONASE) 50 mcg/actuation nasal spray     - Advised on the need to stay well hydrated and that symptoms can last up to 1.5 weeks  - Can use tylenol/motrin as needed for generalized muscle pain and fever  - Can use Sudafed for nasal congestion or nasal saline rinses 2-3 times daily  - Mucinex for cough, may also try Tea with honey  - Chloraseptic throat losenges and saline gargles (1 tsp salt in 8 oz water) for sore   throat  - Wash hand frequently and cough/sneeze into your sleeve to help prevent   infection of others   - Educated on the lack of benefit of antibiotics in a viral illness but advised to call if symptoms worsening past 6 days    I have reviewed patient medical and social history and medications.   I have reviewed pertinent labs results and other data. I have discussed the diagnosis with the patient and the intended plan as seen in the above orders. The patient has received an after-visit summary and questions were answered concerning future plans. I have discussed medication side effects and warnings with the patient as well.     Gem Daniel MD  [address] [place] [address]   11/09/18    Patient discussed with Dr. Shawn Norwood, Attending Physician

## 2018-11-09 NOTE — PROGRESS NOTES
Mony Guzman is a 26 y.o. male  Chief Complaint   Patient presents with    Cough     x2 weeks, has treated with OTC pills per patient, denies any fever, states is productive, some shortness of breath    Hoarse     some sore throat irritation after coughing fit     Visit Vitals  /81 (BP 1 Location: Left arm, BP Patient Position: Sitting)   Pulse 70   Temp 98.5 °F (36.9 °C) (Oral)   Resp 16   Ht 6' (1.829 m)   Wt 181 lb (82.1 kg)   SpO2 98%   BMI 24.55 kg/m²     1. Have you been to the ER, urgent care clinic since your last visit? Hospitalized since your last visit? No    2. Have you seen or consulted any other health care providers outside of the [address] [place] [address] since your last visit? Include any pap smears or colon screening.  No  Health Maintenance Due   Topic Date Due    Influenza Age 5 to Adult  08/01/2018

## 2018-11-09 NOTE — PATIENT INSTRUCTIONS

## 2023-05-17 RX ORDER — FLUTICASONE PROPIONATE 50 MCG
2 SPRAY, SUSPENSION (ML) NASAL DAILY
COMMUNITY
Start: 2018-11-09

## 2023-05-17 RX ORDER — CETIRIZINE HYDROCHLORIDE 10 MG/1
10 TABLET ORAL DAILY
COMMUNITY
Start: 2018-11-09